# Patient Record
Sex: MALE | Race: WHITE | NOT HISPANIC OR LATINO | Employment: FULL TIME | URBAN - METROPOLITAN AREA
[De-identification: names, ages, dates, MRNs, and addresses within clinical notes are randomized per-mention and may not be internally consistent; named-entity substitution may affect disease eponyms.]

---

## 2023-05-31 ENCOUNTER — HOSPITAL ENCOUNTER (OUTPATIENT)
Facility: HOSPITAL | Age: 63
Setting detail: OBSERVATION
Discharge: HOME | End: 2023-06-02
Attending: EMERGENCY MEDICINE | Admitting: EMERGENCY MEDICINE
Payer: COMMERCIAL

## 2023-05-31 DIAGNOSIS — R55 VASOVAGAL NEAR-SYNCOPE: ICD-10-CM

## 2023-05-31 DIAGNOSIS — E86.0 DEHYDRATION: ICD-10-CM

## 2023-05-31 DIAGNOSIS — N17.9 ACUTE KIDNEY INJURY: Primary | ICD-10-CM

## 2023-05-31 LAB
ALBUMIN SERPL-MCNC: 4.5 G/DL (ref 3.5–5.2)
ALBUMIN/GLOB SERPL: 1.6 G/DL
ALP SERPL-CCNC: 118 U/L (ref 39–117)
ALT SERPL W P-5'-P-CCNC: 21 U/L (ref 1–41)
ANION GAP SERPL CALCULATED.3IONS-SCNC: 14 MMOL/L (ref 5–15)
AST SERPL-CCNC: 21 U/L (ref 1–40)
BASOPHILS # BLD AUTO: 0.08 10*3/MM3 (ref 0–0.2)
BASOPHILS NFR BLD AUTO: 0.4 % (ref 0–1.5)
BILIRUB SERPL-MCNC: 0.5 MG/DL (ref 0–1.2)
BUN SERPL-MCNC: 30 MG/DL (ref 8–23)
BUN/CREAT SERPL: 10.9 (ref 7–25)
CALCIUM SPEC-SCNC: 9.4 MG/DL (ref 8.6–10.5)
CHLORIDE SERPL-SCNC: 101 MMOL/L (ref 98–107)
CK SERPL-CCNC: 207 U/L (ref 20–200)
CO2 SERPL-SCNC: 22 MMOL/L (ref 22–29)
CREAT SERPL-MCNC: 2.75 MG/DL (ref 0.76–1.27)
DEPRECATED RDW RBC AUTO: 39 FL (ref 37–54)
EGFRCR SERPLBLD CKD-EPI 2021: 25.1 ML/MIN/1.73
EOSINOPHIL # BLD AUTO: 0.11 10*3/MM3 (ref 0–0.4)
EOSINOPHIL NFR BLD AUTO: 0.6 % (ref 0.3–6.2)
ERYTHROCYTE [DISTWIDTH] IN BLOOD BY AUTOMATED COUNT: 12.9 % (ref 12.3–15.4)
GLOBULIN UR ELPH-MCNC: 2.8 GM/DL
GLUCOSE SERPL-MCNC: 109 MG/DL (ref 65–99)
HCT VFR BLD AUTO: 45.8 % (ref 37.5–51)
HGB BLD-MCNC: 16.4 G/DL (ref 13–17.7)
IMM GRANULOCYTES # BLD AUTO: 0.18 10*3/MM3 (ref 0–0.05)
IMM GRANULOCYTES NFR BLD AUTO: 0.9 % (ref 0–0.5)
LYMPHOCYTES # BLD AUTO: 3.26 10*3/MM3 (ref 0.7–3.1)
LYMPHOCYTES NFR BLD AUTO: 16.5 % (ref 19.6–45.3)
MCH RBC QN AUTO: 30.1 PG (ref 26.6–33)
MCHC RBC AUTO-ENTMCNC: 35.8 G/DL (ref 31.5–35.7)
MCV RBC AUTO: 84.2 FL (ref 79–97)
MONOCYTES # BLD AUTO: 1.56 10*3/MM3 (ref 0.1–0.9)
MONOCYTES NFR BLD AUTO: 7.9 % (ref 5–12)
NEUTROPHILS NFR BLD AUTO: 14.62 10*3/MM3 (ref 1.7–7)
NEUTROPHILS NFR BLD AUTO: 73.7 % (ref 42.7–76)
NRBC BLD AUTO-RTO: 0.1 /100 WBC (ref 0–0.2)
PLATELET # BLD AUTO: 353 10*3/MM3 (ref 140–450)
PMV BLD AUTO: 10.5 FL (ref 6–12)
POTASSIUM SERPL-SCNC: 4.4 MMOL/L (ref 3.5–5.2)
PROT SERPL-MCNC: 7.3 G/DL (ref 6–8.5)
QT INTERVAL: 440 MS
RBC # BLD AUTO: 5.44 10*6/MM3 (ref 4.14–5.8)
SODIUM SERPL-SCNC: 137 MMOL/L (ref 136–145)
TROPONIN T SERPL HS-MCNC: 17 NG/L
WBC NRBC COR # BLD: 19.81 10*3/MM3 (ref 3.4–10.8)

## 2023-05-31 PROCEDURE — G0378 HOSPITAL OBSERVATION PER HR: HCPCS

## 2023-05-31 PROCEDURE — 80053 COMPREHEN METABOLIC PANEL: CPT | Performed by: EMERGENCY MEDICINE

## 2023-05-31 PROCEDURE — 85025 COMPLETE CBC W/AUTO DIFF WBC: CPT | Performed by: EMERGENCY MEDICINE

## 2023-05-31 PROCEDURE — 36415 COLL VENOUS BLD VENIPUNCTURE: CPT

## 2023-05-31 PROCEDURE — 93010 ELECTROCARDIOGRAM REPORT: CPT | Performed by: INTERNAL MEDICINE

## 2023-05-31 PROCEDURE — 84484 ASSAY OF TROPONIN QUANT: CPT | Performed by: EMERGENCY MEDICINE

## 2023-05-31 PROCEDURE — 99285 EMERGENCY DEPT VISIT HI MDM: CPT

## 2023-05-31 PROCEDURE — 93005 ELECTROCARDIOGRAM TRACING: CPT | Performed by: EMERGENCY MEDICINE

## 2023-05-31 PROCEDURE — 82550 ASSAY OF CK (CPK): CPT | Performed by: EMERGENCY MEDICINE

## 2023-05-31 RX ORDER — TIZANIDINE 4 MG/1
4 TABLET ORAL NIGHTLY
COMMUNITY

## 2023-05-31 RX ORDER — LISINOPRIL 20 MG/1
30 TABLET ORAL NIGHTLY
COMMUNITY

## 2023-05-31 RX ORDER — PANTOPRAZOLE SODIUM 40 MG/1
40 TABLET, DELAYED RELEASE ORAL NIGHTLY
Status: DISCONTINUED | OUTPATIENT
Start: 2023-05-31 | End: 2023-05-31

## 2023-05-31 RX ORDER — SODIUM CHLORIDE 0.9 % (FLUSH) 0.9 %
10 SYRINGE (ML) INJECTION EVERY 12 HOURS SCHEDULED
Status: DISCONTINUED | OUTPATIENT
Start: 2023-05-31 | End: 2023-06-02 | Stop reason: HOSPADM

## 2023-05-31 RX ORDER — SODIUM CHLORIDE 9 MG/ML
150 INJECTION, SOLUTION INTRAVENOUS CONTINUOUS
Status: DISCONTINUED | OUTPATIENT
Start: 2023-05-31 | End: 2023-06-02 | Stop reason: HOSPADM

## 2023-05-31 RX ORDER — CITALOPRAM 40 MG/1
40 TABLET ORAL NIGHTLY
Status: DISCONTINUED | OUTPATIENT
Start: 2023-05-31 | End: 2023-05-31

## 2023-05-31 RX ORDER — BUPROPION HYDROCHLORIDE 100 MG/1
100 TABLET ORAL NIGHTLY
COMMUNITY

## 2023-05-31 RX ORDER — AMLODIPINE BESYLATE 10 MG/1
10 TABLET ORAL NIGHTLY
COMMUNITY
Start: 2023-04-26

## 2023-05-31 RX ORDER — MELOXICAM 15 MG/1
15 TABLET ORAL NIGHTLY
COMMUNITY
Start: 2023-05-05

## 2023-05-31 RX ORDER — ONDANSETRON 2 MG/ML
4 INJECTION INTRAMUSCULAR; INTRAVENOUS EVERY 6 HOURS PRN
Status: DISCONTINUED | OUTPATIENT
Start: 2023-05-31 | End: 2023-06-02 | Stop reason: HOSPADM

## 2023-05-31 RX ORDER — ONDANSETRON 4 MG/1
4 TABLET, FILM COATED ORAL EVERY 6 HOURS PRN
Status: DISCONTINUED | OUTPATIENT
Start: 2023-05-31 | End: 2023-06-02 | Stop reason: HOSPADM

## 2023-05-31 RX ORDER — SODIUM CHLORIDE 9 MG/ML
40 INJECTION, SOLUTION INTRAVENOUS AS NEEDED
Status: DISCONTINUED | OUTPATIENT
Start: 2023-05-31 | End: 2023-06-02 | Stop reason: HOSPADM

## 2023-05-31 RX ORDER — ATORVASTATIN CALCIUM 40 MG/1
40 TABLET, FILM COATED ORAL NIGHTLY
COMMUNITY

## 2023-05-31 RX ORDER — SODIUM CHLORIDE 0.9 % (FLUSH) 0.9 %
10 SYRINGE (ML) INJECTION AS NEEDED
Status: DISCONTINUED | OUTPATIENT
Start: 2023-05-31 | End: 2023-06-02 | Stop reason: HOSPADM

## 2023-05-31 RX ORDER — ATORVASTATIN CALCIUM 20 MG/1
40 TABLET, FILM COATED ORAL NIGHTLY
Status: DISCONTINUED | OUTPATIENT
Start: 2023-05-31 | End: 2023-05-31

## 2023-05-31 RX ORDER — BUPROPION HYDROCHLORIDE 100 MG/1
100 TABLET ORAL NIGHTLY
Status: DISCONTINUED | OUTPATIENT
Start: 2023-05-31 | End: 2023-05-31

## 2023-05-31 RX ORDER — AMLODIPINE BESYLATE 10 MG/1
10 TABLET ORAL NIGHTLY
Status: DISCONTINUED | OUTPATIENT
Start: 2023-05-31 | End: 2023-05-31

## 2023-05-31 RX ORDER — TIZANIDINE 4 MG/1
4 TABLET ORAL NIGHTLY
Status: DISCONTINUED | OUTPATIENT
Start: 2023-05-31 | End: 2023-05-31

## 2023-05-31 RX ORDER — CITALOPRAM 40 MG/1
40 TABLET ORAL NIGHTLY
COMMUNITY
Start: 2023-04-02

## 2023-05-31 RX ORDER — PANTOPRAZOLE SODIUM 40 MG/1
40 TABLET, DELAYED RELEASE ORAL NIGHTLY
COMMUNITY
Start: 2023-04-03

## 2023-05-31 RX ORDER — NITROGLYCERIN 0.4 MG/1
0.4 TABLET SUBLINGUAL
Status: DISCONTINUED | OUTPATIENT
Start: 2023-05-31 | End: 2023-06-02 | Stop reason: HOSPADM

## 2023-05-31 RX ORDER — MELOXICAM 15 MG/1
15 TABLET ORAL NIGHTLY
Status: DISCONTINUED | OUTPATIENT
Start: 2023-05-31 | End: 2023-05-31

## 2023-05-31 RX ADMIN — Medication 10 ML: at 21:30

## 2023-05-31 RX ADMIN — SODIUM CHLORIDE, POTASSIUM CHLORIDE, SODIUM LACTATE AND CALCIUM CHLORIDE 1000 ML: 600; 310; 30; 20 INJECTION, SOLUTION INTRAVENOUS at 23:31

## 2023-05-31 RX ADMIN — SODIUM CHLORIDE 150 ML/HR: 9 INJECTION, SOLUTION INTRAVENOUS at 23:24

## 2023-05-31 NOTE — ED PROVIDER NOTES
EMERGENCY DEPARTMENT ENCOUNTER    Room Number:  106/1  Date seen:  5/31/2023  PCP: Robyn Mendieta APRN  Historian: Patient, EMS      HPI:  Chief Complaint: Near syncope  A complete HPI/ROS/PMH/PSH/SH/FH are unobtainable due to: Nothing  Context: Armani Medrano is a 62 y.o. male who presents to the ED from work by EMS c/o near syncope.  Patient was sitting down at work.  He then stood up and began walking.  He became lightheaded and faint feeling.  He felt clammy, sweaty, nauseated.  He thought he was going to pass out.  He then sat down and his symptoms improved.  When he stood up again several minutes later, his symptoms returned.  He never lost consciousness.  Denies preceding headache, chest pain, palpitations, or abdominal pain.  He had a similar episode yesterday.  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  He had an episode of vomiting in route to the ED.  Per EMS, his blood pressure was initially 50/30.  He was given a total of 700 cc of normal saline in route.  Blood pressure was 120/91 at triage.  Denies recent illness, cough, fever, abdominal pain, or diarrhea.  Patient is feeling better now.          PAST MEDICAL HISTORY  Active Ambulatory Problems     Diagnosis Date Noted   • No Active Ambulatory Problems     Resolved Ambulatory Problems     Diagnosis Date Noted   • No Resolved Ambulatory Problems     No Additional Past Medical History         PAST SURGICAL HISTORY  History reviewed. No pertinent surgical history.      FAMILY HISTORY  History reviewed. No pertinent family history.      SOCIAL HISTORY  Social History     Socioeconomic History   • Marital status: Single   Tobacco Use   • Smoking status: Every Day     Packs/day: 1.00     Years: 50.00     Pack years: 50.00     Types: Cigarettes   Vaping Use   • Vaping Use: Never used   Substance and Sexual Activity   • Alcohol use: Not Currently   • Drug use: Never         ALLERGIES  Patient has no known  allergies.        REVIEW OF SYSTEMS  Review of Systems     All systems have been reviewed and are negative except as as discussed in the HPI    PHYSICAL EXAM  ED Triage Vitals   Temp Heart Rate Resp BP SpO2   05/31/23 1641 05/31/23 1641 05/31/23 1641 05/31/23 1641 05/31/23 1641   98 °F (36.7 °C) 79 17 120/91 96 %      Temp src Heart Rate Source Patient Position BP Location FiO2 (%)   -- 05/31/23 1700 05/31/23 1701 05/31/23 1701 --    Monitor Lying Right arm        Physical Exam      GENERAL: Awake, alert, oriented x3.  Well-developed, well-nourished male.  Resting comfortably in no acute distress  HENT: NCAT, nares patent, moist mucous membranes  EYES: Normal, EOMI  CV: regular rhythm, normal rate  RESPIRATORY: normal effort, clear to auscultation bilaterally  ABDOMEN: soft, nontender  MUSCULOSKELETAL: Extremities are nontender with full range of motion.  No calf tenderness  NEURO: Speech is normal.  No facial droop.  Normal strength and light touch sensation in all extremities.  PSYCH:  calm, cooperative  SKIN: warm, dry    Vital signs and nursing notes reviewed.          LAB RESULTS  Recent Results (from the past 24 hour(s))   ECG 12 Lead Syncope    Collection Time: 05/31/23  5:43 PM   Result Value Ref Range    QT Interval 440 ms   Comprehensive Metabolic Panel    Collection Time: 05/31/23  6:11 PM    Specimen: Blood   Result Value Ref Range    Glucose 109 (H) 65 - 99 mg/dL    BUN 30 (H) 8 - 23 mg/dL    Creatinine 2.75 (H) 0.76 - 1.27 mg/dL    Sodium 137 136 - 145 mmol/L    Potassium 4.4 3.5 - 5.2 mmol/L    Chloride 101 98 - 107 mmol/L    CO2 22.0 22.0 - 29.0 mmol/L    Calcium 9.4 8.6 - 10.5 mg/dL    Total Protein 7.3 6.0 - 8.5 g/dL    Albumin 4.5 3.5 - 5.2 g/dL    ALT (SGPT) 21 1 - 41 U/L    AST (SGOT) 21 1 - 40 U/L    Alkaline Phosphatase 118 (H) 39 - 117 U/L    Total Bilirubin 0.5 0.0 - 1.2 mg/dL    Globulin 2.8 gm/dL    A/G Ratio 1.6 g/dL    BUN/Creatinine Ratio 10.9 7.0 - 25.0    Anion Gap 14.0 5.0 - 15.0  mmol/L    eGFR 25.1 (L) >60.0 mL/min/1.73   Single High Sensitivity Troponin T    Collection Time: 05/31/23  6:11 PM    Specimen: Blood   Result Value Ref Range    HS Troponin T 17 (H) <15 ng/L   CBC Auto Differential    Collection Time: 05/31/23  6:11 PM    Specimen: Blood   Result Value Ref Range    WBC 19.81 (H) 3.40 - 10.80 10*3/mm3    RBC 5.44 4.14 - 5.80 10*6/mm3    Hemoglobin 16.4 13.0 - 17.7 g/dL    Hematocrit 45.8 37.5 - 51.0 %    MCV 84.2 79.0 - 97.0 fL    MCH 30.1 26.6 - 33.0 pg    MCHC 35.8 (H) 31.5 - 35.7 g/dL    RDW 12.9 12.3 - 15.4 %    RDW-SD 39.0 37.0 - 54.0 fl    MPV 10.5 6.0 - 12.0 fL    Platelets 353 140 - 450 10*3/mm3    Neutrophil % 73.7 42.7 - 76.0 %    Lymphocyte % 16.5 (L) 19.6 - 45.3 %    Monocyte % 7.9 5.0 - 12.0 %    Eosinophil % 0.6 0.3 - 6.2 %    Basophil % 0.4 0.0 - 1.5 %    Immature Grans % 0.9 (H) 0.0 - 0.5 %    Neutrophils, Absolute 14.62 (H) 1.70 - 7.00 10*3/mm3    Lymphocytes, Absolute 3.26 (H) 0.70 - 3.10 10*3/mm3    Monocytes, Absolute 1.56 (H) 0.10 - 0.90 10*3/mm3    Eosinophils, Absolute 0.11 0.00 - 0.40 10*3/mm3    Basophils, Absolute 0.08 0.00 - 0.20 10*3/mm3    Immature Grans, Absolute 0.18 (H) 0.00 - 0.05 10*3/mm3    nRBC 0.1 0.0 - 0.2 /100 WBC   CK    Collection Time: 05/31/23  6:11 PM    Specimen: Blood   Result Value Ref Range    Creatine Kinase 207 (H) 20 - 200 U/L       Ordered the above labs and reviewed the results.        RADIOLOGY  No Radiology Exams Resulted Within Past 24 Hours    Ordered the above noted radiological studies. Reviewed by me in PACS.            PROCEDURES  Procedures              MEDICATIONS GIVEN IN ER  Medications   lactated ringers bolus 1,000 mL (has no administration in time range)   sodium chloride 0.9 % flush 10 mL (10 mL Intravenous Given 5/31/23 2130)   sodium chloride 0.9 % flush 10 mL (has no administration in time range)   sodium chloride 0.9 % infusion 40 mL (has no administration in time range)   ondansetron (ZOFRAN) tablet 4 mg  (has no administration in time range)     Or   ondansetron (ZOFRAN) injection 4 mg (has no administration in time range)   nitroglycerin (NITROSTAT) SL tablet 0.4 mg (has no administration in time range)   sodium chloride 0.9 % infusion (has no administration in time range)   amLODIPine (NORVASC) tablet 10 mg (has no administration in time range)   buPROPion (WELLBUTRIN) tablet 100 mg (has no administration in time range)   citalopram (CeleXA) tablet 40 mg (has no administration in time range)   lisinopril (PRINIVIL,ZESTRIL) tablet 30 mg (has no administration in time range)   pantoprazole (PROTONIX) EC tablet 40 mg (has no administration in time range)   tiZANidine (ZANAFLEX) tablet 4 mg (has no administration in time range)                   MEDICAL DECISION MAKING, PROGRESS, and CONSULTS    All labs have been independently reviewed by me.  All radiology studies have been reviewed by me and I have also reviewed the radiology report.   EKG's independently viewed and interpreted by me.  Discussion below represents my analysis of pertinent findings related to patient's condition, differential diagnosis, treatment plan and final disposition.      Additional sources:  - Discussed/ obtained information from independent historians: EMS    - External (non-ED) record review: Patient does not have any previous records in the EMR.    - Chronic or social conditions impacting care: N/A          Orders placed during this visit:  Orders Placed This Encounter   Procedures   • Comprehensive Metabolic Panel   • Single High Sensitivity Troponin T   • CBC Auto Differential   • CK   • CBC (No Diff)   • Basic Metabolic Panel   • Diet: Regular/House Diet; Texture: Regular Texture (IDDSI 7); Fluid Consistency: Thin (IDDSI 0)   • Advance Diet As Tolerated -   • Intake & Output   • Weigh Patient   • Oral Care   • Place Sequential Compression Device   • Maintain Sequential Compression Device   • Telemetry - Maintain IV Access   • Telemetry  - Place Orders & Notify Provider of Results When Patient Experiences Acute Chest Pain, Dysrhythmia or Respiratory Distress   • May Be Off Telemetry for Tests   • Vital Signs   • Pulse Oximetry, Continuous   • Activity - Ad Kym   • Code Status and Medical Interventions:   • ECG 12 Lead Syncope   • Insert Peripheral IV   • Initiate ED Observation Status   • CBC & Differential         Additional orders considered but not ordered:  N/A        Differential diagnosis:    Orthostasis, vasovagal episode, dehydration, electrolyte abnormality, arrhythmia      Independent interpretation of labs, radiology studies, and discussions with consultants:  ED Course as of 05/31/23 2307   Wed May 31, 2023   1752 EKG personally interpreted by me.  My personal interpretation is:          EKG time: 1743  Rhythm/Rate: Sinus rhythm, rate 66  P waves and LA: Normal  QRS, axis: LAD, inferior Q waves  ST and T waves: Normal    Interpreted Contemporaneously by me at 1748, independently viewed  No prior available for comparison    []   1947 BUN(!): 30 []   1947 Creatinine(!): 2.75 []   1947 WBC(!): 19.81 []   1947 HS Troponin T(!): 17 [WH]   1954 Test results discussed with the patient.  He is resting comfortably.  Blood pressure is currently 113/66.  Heart rate is in the 60s.  Patient's BUN and creatinine are elevated.  He denies any history of kidney disease.  He saw his PCP about 3 months ago and had labs done at that time and was not told there were any abnormalities then.  Suspect that his near-syncopal episode was due to hypotension from dehydration.  Patient will be given additional IV fluids and will be admitted.  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  Temperature has been around 80 the past 2 days.  He states that he has been drinking soft drinks at work and has not been drinking much water. [WH]   2000 Case discussed with PAULINO Yu, and she agrees to admit the patient to Dr. Ramírez.  Pertinent  history, exam findings, test results, ED course, and diagnoses were discussed with her. []   2100 Creatine Kinase(!): 207 []      ED Course User Index  [] Armani Che MD               DIAGNOSIS  Final diagnoses:   Acute kidney injury   Vasovagal near-syncope   Dehydration         DISPOSITION  ADMISSION    Discussed treatment plan and reason for admission with pt/family and admitting physician.  Pt/family voiced understanding of the plan for admission for further testing/treatment as needed.                 Latest Documented Vital Signs:  As of 23:07 EDT  BP- 154/66 HR- 66 Temp- 97.8 °F (36.6 °C) (Oral) O2 sat- 96%              --    Please note that portions of this were completed with a voice recognition program.       Note Disclaimer: At [facility], we believe that sharing information builds trust and better relationships. You are receiving this note because you are receiving care at [facility] or recently visited. It is possible you will see health information before a provider has talked with you about it. This kind of information can be easy to misunderstand. To help you fully understand what it means for your health, we urge you to discuss this note with your provider.           Armani Che MD  05/31/23 3423

## 2023-05-31 NOTE — ED NOTES
Pt to er via ems from home. Pt had near syncopal episode. Upon EMS arrival pt BP 50/30. Pt has received 700 NS.

## 2023-06-01 ENCOUNTER — APPOINTMENT (OUTPATIENT)
Dept: CARDIOLOGY | Facility: HOSPITAL | Age: 63
End: 2023-06-01
Payer: COMMERCIAL

## 2023-06-01 ENCOUNTER — APPOINTMENT (OUTPATIENT)
Dept: ULTRASOUND IMAGING | Facility: HOSPITAL | Age: 63
End: 2023-06-01
Payer: COMMERCIAL

## 2023-06-01 LAB
ANION GAP SERPL CALCULATED.3IONS-SCNC: 13.5 MMOL/L (ref 5–15)
AORTIC ARCH: 3.7 CM
AORTIC DIMENSIONLESS INDEX: 1 (DI)
ASCENDING AORTA: 3.2 CM
BACTERIA UR QL AUTO: ABNORMAL /HPF
BH CV ECHO MEAS - AO MAX PG: 7 MMHG
BH CV ECHO MEAS - AO MEAN PG: 4 MMHG
BH CV ECHO MEAS - AO V2 MAX: 131.9 CM/SEC
BH CV ECHO MEAS - AO V2 VTI: 27.7 CM
BH CV ECHO MEAS - AVA(I,D): 3.8 CM2
BH CV ECHO MEAS - EDV(CUBED): 112.4 ML
BH CV ECHO MEAS - EDV(MOD-SP2): 65 ML
BH CV ECHO MEAS - EDV(MOD-SP4): 112 ML
BH CV ECHO MEAS - EF(MOD-BP): 67 %
BH CV ECHO MEAS - EF(MOD-SP2): 66.2 %
BH CV ECHO MEAS - EF(MOD-SP4): 66.1 %
BH CV ECHO MEAS - ESV(CUBED): 34.3 ML
BH CV ECHO MEAS - ESV(MOD-SP2): 22 ML
BH CV ECHO MEAS - ESV(MOD-SP4): 38 ML
BH CV ECHO MEAS - FS: 32.7 %
BH CV ECHO MEAS - IVS/LVPW: 1.19 CM
BH CV ECHO MEAS - IVSD: 1.17 CM
BH CV ECHO MEAS - LAT PEAK E' VEL: 13.7 CM/SEC
BH CV ECHO MEAS - LV DIASTOLIC VOL/BSA (35-75): 50.5 CM2
BH CV ECHO MEAS - LV MASS(C)D: 190.4 GRAMS
BH CV ECHO MEAS - LV MAX PG: 7.4 MMHG
BH CV ECHO MEAS - LV MEAN PG: 3.6 MMHG
BH CV ECHO MEAS - LV SYSTOLIC VOL/BSA (12-30): 17.1 CM2
BH CV ECHO MEAS - LV V1 MAX: 135.7 CM/SEC
BH CV ECHO MEAS - LV V1 VTI: 27.8 CM
BH CV ECHO MEAS - LVIDD: 4.8 CM
BH CV ECHO MEAS - LVIDS: 3.2 CM
BH CV ECHO MEAS - LVOT AREA: 3.8 CM2
BH CV ECHO MEAS - LVOT DIAM: 2.2 CM
BH CV ECHO MEAS - LVPWD: 0.99 CM
BH CV ECHO MEAS - MED PEAK E' VEL: 8.8 CM/SEC
BH CV ECHO MEAS - MV A DUR: 0.12 SEC
BH CV ECHO MEAS - MV A MAX VEL: 68.1 CM/SEC
BH CV ECHO MEAS - MV DEC SLOPE: 288.8 CM/SEC2
BH CV ECHO MEAS - MV DEC TIME: 225 MSEC
BH CV ECHO MEAS - MV E MAX VEL: 62.7 CM/SEC
BH CV ECHO MEAS - MV E/A: 0.92
BH CV ECHO MEAS - MV MEAN PG: 1.1 MMHG
BH CV ECHO MEAS - MV P1/2T: 86.5 MSEC
BH CV ECHO MEAS - MV V2 VTI: 24.5 CM
BH CV ECHO MEAS - MVA(P1/2T): 2.5 CM2
BH CV ECHO MEAS - MVA(VTI): 4.3 CM2
BH CV ECHO MEAS - PA ACC SLOPE: 1358 CM/SEC2
BH CV ECHO MEAS - PA ACC TIME: 0.08 SEC
BH CV ECHO MEAS - PA PR(ACCEL): 42.6 MMHG
BH CV ECHO MEAS - PA V2 MAX: 104.5 CM/SEC
BH CV ECHO MEAS - PULM A REVS DUR: 0.11 SEC
BH CV ECHO MEAS - PULM A REVS VEL: 24.3 CM/SEC
BH CV ECHO MEAS - PULM DIAS VEL: 51.7 CM/SEC
BH CV ECHO MEAS - PULM S/D: 1
BH CV ECHO MEAS - PULM SYS VEL: 51.7 CM/SEC
BH CV ECHO MEAS - RV MAX PG: 2.25 MMHG
BH CV ECHO MEAS - RV V1 MAX: 75 CM/SEC
BH CV ECHO MEAS - RV V1 VTI: 17.4 CM
BH CV ECHO MEAS - SI(MOD-SP2): 19.4 ML/M2
BH CV ECHO MEAS - SI(MOD-SP4): 33.4 ML/M2
BH CV ECHO MEAS - SV(LVOT): 106 ML
BH CV ECHO MEAS - SV(MOD-SP2): 43 ML
BH CV ECHO MEAS - SV(MOD-SP4): 74 ML
BH CV ECHO MEASUREMENTS AVERAGE E/E' RATIO: 5.57
BH CV XLRA - TDI S': 13.8 CM/SEC
BILIRUB UR QL STRIP: NEGATIVE
BUN SERPL-MCNC: 35 MG/DL (ref 8–23)
BUN/CREAT SERPL: 16.7 (ref 7–25)
CALCIUM SPEC-SCNC: 9.2 MG/DL (ref 8.6–10.5)
CHLORIDE SERPL-SCNC: 102 MMOL/L (ref 98–107)
CHLORIDE UR-SCNC: 44 MMOL/L
CLARITY UR: CLEAR
CO2 SERPL-SCNC: 22.5 MMOL/L (ref 22–29)
COLOR UR: YELLOW
CREAT SERPL-MCNC: 2.09 MG/DL (ref 0.76–1.27)
CREAT UR-MCNC: 94.8 MG/DL
DEPRECATED RDW RBC AUTO: 39.9 FL (ref 37–54)
EGFRCR SERPLBLD CKD-EPI 2021: 34.9 ML/MIN/1.73
ERYTHROCYTE [DISTWIDTH] IN BLOOD BY AUTOMATED COUNT: 12.8 % (ref 12.3–15.4)
GLUCOSE SERPL-MCNC: 94 MG/DL (ref 65–99)
GLUCOSE UR STRIP-MCNC: NEGATIVE MG/DL
HCT VFR BLD AUTO: 44.1 % (ref 37.5–51)
HGB BLD-MCNC: 15.1 G/DL (ref 13–17.7)
HGB UR QL STRIP.AUTO: ABNORMAL
HYALINE CASTS UR QL AUTO: ABNORMAL /LPF
KETONES UR QL STRIP: NEGATIVE
LEFT ATRIUM VOLUME INDEX: 25.2 ML/M2
LEUKOCYTE ESTERASE UR QL STRIP.AUTO: ABNORMAL
MAXIMAL PREDICTED HEART RATE: 157 BPM
MCH RBC QN AUTO: 29.1 PG (ref 26.6–33)
MCHC RBC AUTO-ENTMCNC: 34.2 G/DL (ref 31.5–35.7)
MCV RBC AUTO: 85 FL (ref 79–97)
NITRITE UR QL STRIP: NEGATIVE
PH UR STRIP.AUTO: 5.5 [PH] (ref 5–8)
PLATELET # BLD AUTO: 322 10*3/MM3 (ref 140–450)
PMV BLD AUTO: 10.4 FL (ref 6–12)
POTASSIUM SERPL-SCNC: 3.9 MMOL/L (ref 3.5–5.2)
PROT ?TM UR-MCNC: 10.1 MG/DL
PROT UR QL STRIP: NEGATIVE
PROT/CREAT UR: 106.5 MG/G CREA (ref 0–200)
RBC # BLD AUTO: 5.19 10*6/MM3 (ref 4.14–5.8)
RBC # UR STRIP: ABNORMAL /HPF
REF LAB TEST METHOD: ABNORMAL
SODIUM SERPL-SCNC: 138 MMOL/L (ref 136–145)
SODIUM UR-SCNC: 68 MMOL/L
SP GR UR STRIP: 1.01 (ref 1–1.03)
SQUAMOUS #/AREA URNS HPF: ABNORMAL /HPF
STRESS TARGET HR: 133 BPM
UROBILINOGEN UR QL STRIP: ABNORMAL
WBC # UR STRIP: ABNORMAL /HPF
WBC NRBC COR # BLD: 13.26 10*3/MM3 (ref 3.4–10.8)

## 2023-06-01 PROCEDURE — 96361 HYDRATE IV INFUSION ADD-ON: CPT

## 2023-06-01 PROCEDURE — 99204 OFFICE O/P NEW MOD 45 MIN: CPT | Performed by: INTERNAL MEDICINE

## 2023-06-01 PROCEDURE — 85027 COMPLETE CBC AUTOMATED: CPT

## 2023-06-01 PROCEDURE — 80048 BASIC METABOLIC PNL TOTAL CA: CPT

## 2023-06-01 PROCEDURE — G0378 HOSPITAL OBSERVATION PER HR: HCPCS

## 2023-06-01 PROCEDURE — 81001 URINALYSIS AUTO W/SCOPE: CPT | Performed by: INTERNAL MEDICINE

## 2023-06-01 PROCEDURE — 82570 ASSAY OF URINE CREATININE: CPT | Performed by: INTERNAL MEDICINE

## 2023-06-01 PROCEDURE — 84156 ASSAY OF PROTEIN URINE: CPT | Performed by: INTERNAL MEDICINE

## 2023-06-01 PROCEDURE — 93306 TTE W/DOPPLER COMPLETE: CPT | Performed by: INTERNAL MEDICINE

## 2023-06-01 PROCEDURE — 84300 ASSAY OF URINE SODIUM: CPT | Performed by: INTERNAL MEDICINE

## 2023-06-01 PROCEDURE — 25510000001 PERFLUTREN (DEFINITY) 8.476 MG IN SODIUM CHLORIDE (PF) 0.9 % 10 ML INJECTION: Performed by: NURSE PRACTITIONER

## 2023-06-01 PROCEDURE — 76775 US EXAM ABDO BACK WALL LIM: CPT

## 2023-06-01 PROCEDURE — 96360 HYDRATION IV INFUSION INIT: CPT

## 2023-06-01 PROCEDURE — 93306 TTE W/DOPPLER COMPLETE: CPT

## 2023-06-01 PROCEDURE — 82436 ASSAY OF URINE CHLORIDE: CPT | Performed by: INTERNAL MEDICINE

## 2023-06-01 RX ADMIN — Medication 10 ML: at 09:00

## 2023-06-01 RX ADMIN — SODIUM CHLORIDE 150 ML/HR: 9 INJECTION, SOLUTION INTRAVENOUS at 19:50

## 2023-06-01 RX ADMIN — PERFLUTREN 2 ML: 6.52 INJECTION, SUSPENSION INTRAVENOUS at 10:56

## 2023-06-01 NOTE — PLAN OF CARE
Goal Outcome Evaluation:   Patient admitted for acute kidney injury.   Outcome summary: Patient stable throughout the night. AO x 4, up ad lois. Monitoring for dehydration and awaiting morning labs for kidney function. If improvement, can go home.

## 2023-06-01 NOTE — PROGRESS NOTES
ED OBSERVATION PROGRESS/DISCHARGE SUMMARY    Date of Admission: 5/31/2023   LOS: 0 days   PCP: Robyn Mendieta APRN    Final Diagnosis Acute Kidney injury, Syncope      Subjective     Hospital Outcome:   Pleasant afebrile ambulatory 62  male admitted to the ED Observation unit for near syncope. He was found to have an acute kidney injury.He reports he only mountain [place]w usually. He was seen and evaluated by Dr. Franks with the nephrology service and will undergo renal ultrasound today.     His orthostatic vital signs were negative this morning. After a liter bolus and increased PO intake his creatinine improved from 2.75 to 2.09 this morning.  He underwent echocardiogram which showed ejection fraction 60 to 65% without evidence pericardial effusion.  Seen and evaluated by Dr. Chacon with cardiology service who does not feel there is an acute cardiac event that his cause patient's episode.    ROS:  General: no fevers, chills  Respiratory: no cough, dyspnea  Cardiovascular: no chest pain, palpitations  Abdomen: No abdominal pain, nausea, vomiting, or diarrhea  Neurologic: No focal weakness    Objective   Physical Exam:  I have reviewed the vital signs.  Temp:  [97.4 °F (36.3 °C)-98 °F (36.7 °C)] 97.4 °F (36.3 °C)  Heart Rate:  [66-81] 77  Resp:  [16-18] 16  BP: ()/(49-91) 153/66  General Appearance:    Alert, cooperative, no distress  Head:    Normocephalic, atraumatic  Eyes:    Sclerae anicteric  Neck:   Supple, no mass  Lungs: Clear to auscultation bilaterally, respirations unlabored  Heart: Regular rate and rhythm, S1 and S2 normal, no murmur, rub or gallop  Abdomen:  Soft, non-tender, bowel sounds active, nondistended  Extremities: No clubbing, cyanosis, or edema to lower extremities  Pulses:  2+ and symmetric in distal lower extremities  Skin: No rashes   Neurologic: Oriented x3, Normal strength to extremities    Results Review:    I have reviewed the labs, radiology results and  diagnostic studies.    Results from last 7 days   Lab Units 06/01/23  0617   WBC 10*3/mm3 13.26*   HEMOGLOBIN g/dL 15.1   HEMATOCRIT % 44.1   PLATELETS 10*3/mm3 322     Results from last 7 days   Lab Units 06/01/23  0617 05/31/23  1811   SODIUM mmol/L 138 137   POTASSIUM mmol/L 3.9 4.4   CHLORIDE mmol/L 102 101   CO2 mmol/L 22.5 22.0   BUN mg/dL 35* 30*   CREATININE mg/dL 2.09* 2.75*   CALCIUM mg/dL 9.2 9.4   BILIRUBIN mg/dL  --  0.5   ALK PHOS U/L  --  118*   ALT (SGPT) U/L  --  21   AST (SGOT) U/L  --  21   GLUCOSE mg/dL 94 109*     Imaging Results (Last 24 Hours)     ** No results found for the last 24 hours. **          I have reviewed the medications.  ---------------------------------------------------------------------------------------------  Assessment & Plan   Assessment/Problem List    Acute kidney injury      Plan:    Acute kidney injury  - Cardiac monitoring  - Continuous pulse ox  - Vital signs every 4 hours  - Normal saline at 150 mL/h  - Repeat labs in a.m.  - Avoid nephrotoxic drugs  -Nephrology consult following along  -Cardiology Consult, cleared  -orthostatic vitals signs negative this AM  -renal ultrasound normal    Disposition: I anticipate patient will be discharged home tomorrow.     This note will serve as a progress note.    Gabriella Ingram, APRN 06/01/23 08:56 EDT    I have worn appropriate PPE during this patient encounter, sanitized my hands both with entering and exiting patient's room.      54 minutes has been spent by [facility] providers in the care of this patient while under observation status

## 2023-06-01 NOTE — CONSULTS
[facility] Consult Note      Patient Name: Armani Medrano  : 1960  MRN: [id number]  Primary Care Physician:  Robyn Mendieta APRN  Referring Physician: No ref. provider found  Date of admission: 2023    Subjective     Reason for Consult:  JERICA    HPI:   Armani Medrano is a 62 y.o. male with a past medical history of hypertension and NSAID use who came into the hospital because of syncopal-like episode and dizziness.  Patient does take NSAIDs as an outpatient.  He has been working very hard environment.  At time of arrival of the EMS his blood pressure was 50/30 and creatinine was found to be 2.7 milligrams per deciliter.  We were consulted to help with management of his acute kidney injury.  Patient was started on IV fluid and his creatinine down to 2 mg/dL    Review of Systems:   14 point review of systems is otherwise negative except for mentioned above on HPI    Personal History     History reviewed. No pertinent past medical history.    History reviewed. No pertinent surgical history.    Family History: family history is not on file.    Social History:  reports that he has been smoking cigarettes. He has a 50.00 pack-year smoking history. He does not have any smokeless tobacco history on file. He reports that he does not currently use alcohol. He reports that he does not use drugs.    Home Medications:  Prior to Admission medications    Medication Sig Start Date End Date Taking? Authorizing Provider   amLODIPine (NORVASC) 10 MG tablet Take 1 tablet by mouth Every Night. 23  Yes Provider, MD Michelle   atorvastatin (LIPITOR) 40 MG tablet Take 1 tablet by mouth Every Night.   Yes Provider, MD Michelle   buPROPion (WELLBUTRIN) 100 MG tablet Take 1 tablet by mouth Every Night.   Yes Provider, MD Michelle   citalopram (CeleXA) 40 MG tablet Take 1 tablet by mouth Every Night. 23  Yes Provider, MD Michelle   lisinopril (PRINIVIL,ZESTRIL) 20 MG tablet  Take 1.5 tablets by mouth Every Night.   Yes ProviderMichelle MD   meloxicam (MOBIC) 15 MG tablet Take 1 tablet by mouth Every Night. 5/5/23  Yes Michelle Browne MD   pantoprazole (PROTONIX) 40 MG EC tablet Take 1 tablet by mouth Every Night. 4/3/23  Yes Michelle Browne MD   tiZANidine (ZANAFLEX) 4 MG tablet Take 1 tablet by mouth Every Night.   Yes Provider, MD Michelle       Allergies:  No Known Allergies    Objective     Vitals:   Temp:  [97.4 °F (36.3 °C)-98 °F (36.7 °C)] 97.4 °F (36.3 °C)  Heart Rate:  [66-81] 77  Resp:  [16-18] 16  BP: ()/(49-91) 153/66  Flow (L/min):  [2] 2  No intake or output data in the 24 hours ending 06/01/23 0903    Physical Exam:    General Appearance: alert, oriented x 3, no acute distress   Skin: warm and dry  HEENT: oral mucosa normal, nonicteric sclera  Neck: supple, no JVD  Lungs: CTA  Heart: RRR, normal S1 and S2  Abdomen: soft, nontender, nondistended  : no palpable bladder  Extremities: no edema, cyanosis or clubbing  Neuro: normal speech and mental status     Scheduled Meds:     sodium chloride, 10 mL, Intravenous, Q12H      IV Meds:   sodium chloride, 150 mL/hr, Last Rate: 150 mL/hr (06/01/23 0209)        Results Reviewed:   I have personally reviewed the results from the time of this admission to 6/1/2023 09:03 EDT     Lab Results   Component Value Date    GLUCOSE 94 06/01/2023    CALCIUM 9.2 06/01/2023     06/01/2023    K 3.9 06/01/2023    CO2 22.5 06/01/2023     06/01/2023    BUN 35 (H) 06/01/2023    CREATININE 2.09 (H) 06/01/2023    BCR 16.7 06/01/2023    ANIONGAP 13.5 06/01/2023      Lab Results   Component Value Date    ALBUMIN 4.5 05/31/2023           Assessment / Plan     ASSESSMENT:    1.  Acute kidney injury that appears to be due to intravascular volume depletion in the setting of impaired renal autoregulation due to NSAID use.  We will continue IV hydration.  Will obtain UA and urine electrolytes.  We will check renal  ultrasound as well.  2.  Intravascular volume depletion continue with IV hydration  3.  History of hypertension continue current management for now and adjust medication as needed    PLAN:    1.  Continue IV hydration  2.  Urine electrolytes and renal ultrasound  3.  Avoid nephrotoxic medications  4.  Surveillance labs    Thank you for involving us in the care of Armani Medrano.  Please feel free to call with any questions.    Chris Franks MD  06/01/23  09:03 EDT    [facility]  [phone]

## 2023-06-01 NOTE — CONSULTS
Date of Consultation: 23    Referral Provider: Reilly Ramírez MD     Reason for Consultation: Syncope    Encounter Provider: Charanjit Chacon MD    Group of Service: [facility]     Patient Name: Armani Medrano    :1960    Chief complaint: Near syncope.    History of Present Illness:  Armani Medrano is a 62 year old with a past medical history of HTN and KAL who presented to [facility] ED with complaints of a near syncopal episode. He works as a  and reports feeling lightheaded, clammy, and nauseated when standing. He sat down to rest and 5-10 minutes his symptoms improved. He stood up again and symptoms returned. This happened 5 times so he called EMS. Upon arrival to the ED his BP was 50/30. He was given IV fluids and his BP normalized. He reports that he drinks a five hour energy in the morning and then Mt. Dew or Coke, and he does not drink water.  He has responded very well to hydration and feels much better.  He has not had any chest pain, palpitations, or other symptoms.        History reviewed. No pertinent past medical history.      History reviewed. No pertinent surgical history.      No Known Allergies      No current facility-administered medications on file prior to encounter.     Current Outpatient Medications on File Prior to Encounter   Medication Sig Dispense Refill   • amLODIPine (NORVASC) 10 MG tablet Take 1 tablet by mouth Every Night.     • atorvastatin (LIPITOR) 40 MG tablet Take 1 tablet by mouth Every Night.     • buPROPion (WELLBUTRIN) 100 MG tablet Take 1 tablet by mouth Every Night.     • citalopram (CeleXA) 40 MG tablet Take 1 tablet by mouth Every Night.     • lisinopril (PRINIVIL,ZESTRIL) 20 MG tablet Take 1.5 tablets by mouth Every Night.     • meloxicam (MOBIC) 15 MG tablet Take 1 tablet by mouth Every Night.     • pantoprazole (PROTONIX) 40 MG EC tablet Take 1 tablet by mouth Every Night.     • tiZANidine (ZANAFLEX) 4 MG tablet Take 1  "tablet by mouth Every Night.           Social History     Socioeconomic History   • Marital status: Single   Tobacco Use   • Smoking status: Every Day     Packs/day: 1.00     Years: 50.00     Pack years: 50.00     Types: Cigarettes   Vaping Use   • Vaping Use: Never used   Substance and Sexual Activity   • Alcohol use: Not Currently   • Drug use: Never         History reviewed. No pertinent family history.    REVIEW OF SYSTEMS:   Pertinent positives are noted in the HPI above.  Otherwise, all other systems were reviewed, and are negative.     Objective:     Vitals:    06/01/23 0820 06/01/23 1107 06/01/23 1122 06/01/23 1558   BP: 153/66 134/61 146/71 144/66   BP Location: Right arm  Right arm Right arm   Patient Position: Standing  Lying Lying   Pulse: 77 67 74 59   Resp:   16 16   Temp:   98 °F (36.7 °C) 98.2 °F (36.8 °C)   TempSrc:   Oral Oral   SpO2:   95% 95%   Weight:  107 kg (236 lb)     Height:  175.3 cm (69\")       Body mass index is 34.85 kg/m².  Flowsheet Rows    Flowsheet Row First Filed Value   Admission Height 175.3 cm (69\") Documented at 05/31/2023 1641   Admission Weight 109 kg (240 lb) Documented at 05/31/2023 1641           General:    No acute distress, alert and oriented x4, pleasant                   Head:    Normocephalic, atraumatic.   Eyes:          Conjunctivae and sclerae normal, no icterus.   Throat:   No oral lesions, no thrush, oral mucosa moist.    Neck:   Supple, trachea midline.   Lungs:     Clear to auscultation bilaterally     Heart:    Regular rhythm and normal rate.  No murmurs, gallops, or rubs noted.   Abdomen:     Soft, non-tender, non-distended, positive bowel sounds.    Extremities:   No clubbing, cyanosis, or edema.     Pulses:   Pulses palpable and equal bilaterally.    Skin:   No bleeding or rash.   Neuro:   Non-focal.  Moves all extremities well.    Psychiatric:   Normal mood and affect.     Lab Review:                Results from last 7 days   Lab Units 06/01/23  0617 "   SODIUM mmol/L 138   POTASSIUM mmol/L 3.9   CHLORIDE mmol/L 102   CO2 mmol/L 22.5   BUN mg/dL 35*   CREATININE mg/dL 2.09*   GLUCOSE mg/dL 94   CALCIUM mg/dL 9.2     Results from last 7 days   Lab Units 05/31/23  1811   CK TOTAL U/L 207*   HSTROP T ng/L 17*     Results from last 7 days   Lab Units 06/01/23  0617   WBC 10*3/mm3 13.26*   HEMOGLOBIN g/dL 15.1   HEMATOCRIT % 44.1   PLATELETS 10*3/mm3 322                       EKG (reviewed by me personally):              Assessment:   1.  Near syncope secondary to volume depletion and hypotension  2.  Acute kidney injury  3.  Hypertension at baseline   4.  Obstructive sleep apnea    Plan:       The near syncope was almost certainly secondary to hypotension secondary to volume depletion.  He has responded excellently to IV fluids.  He has had no arrhythmias or rhythm issues since arrival.  I did review his echocardiogram in detail.  His ejection fraction was normal at 60 to 65%, and his heart looked excellent structurally.  I do not feel this is from a cardiac cause.  He does not require any further cardiac evaluation at this time.  I discussed this in detail with the patient at bedside.    Thank you very much for this consult.    Suresh Chacon MD

## 2023-06-01 NOTE — ED NOTES
.Nursing report ED to floor  Armani Medrano  62 y.o.  male    HPI :   Chief Complaint   Patient presents with    Syncope       Admitting doctor:   Reilly Ramírez MD    Admitting diagnosis:   The primary encounter diagnosis was Acute kidney injury. Diagnoses of Vasovagal near-syncope and Dehydration were also pertinent to this visit.    Code status:   Current Code Status       Date Active Code Status Order ID Comments User Context       5/31/2023 2002 CPR (Attempt to Resuscitate) [id number]  Treva Bojorquez APRN ED        Question Answer    Code Status (Patient has no pulse and is not breathing) CPR (Attempt to Resuscitate)    Medical Interventions (Patient has pulse or is breathing) Full Support                    Allergies:   Patient has no known allergies.    Isolation:   No active isolations    Intake and Output  No intake or output data in the 24 hours ending 05/31/23 2014    Weight:       05/31/23  1641   Weight: 109 kg (240 lb)       Most recent vitals:   Vitals:    05/31/23 1730 05/31/23 1731 05/31/23 1831 05/31/23 1953   BP:  122/64 146/68 113/66   BP Location:  Right arm Right arm Right arm   Patient Position:  Lying Sitting Lying   Pulse: 71  70 68   Resp:  18 18 18   Temp:       SpO2: 99%  94% 94%   Weight:       Height:           Active LDAs/IV Access:   Lines, Drains & Airways       Active LDAs       Name Placement date Placement time Site Days    Peripheral IV 05/31/23 1643 Left Antecubital 05/31/23 1643  Antecubital  less than 1                    Labs (abnormal labs have a star):   Labs Reviewed   COMPREHENSIVE METABOLIC PANEL - Abnormal; Notable for the following components:       Result Value    Glucose 109 (*)     BUN 30 (*)     Creatinine 2.75 (*)     Alkaline Phosphatase 118 (*)     eGFR 25.1 (*)     All other components within normal limits    Narrative:     GFR Normal >60  Chronic Kidney Disease <60  Kidney Failure <15     SINGLE HSTROPONIN T - Abnormal; Notable for the following components:     HS Troponin T 17 (*)     All other components within normal limits    Narrative:     High Sensitive Troponin T Reference Range:  <10.0 ng/L- Negative Female for AMI  <15.0 ng/L- Negative Male for AMI  >=10 - Abnormal Female indicating possible myocardial injury.  >=15 - Abnormal Male indicating possible myocardial injury.   Clinicians would have to utilize clinical acumen, EKG, Troponin, and serial changes to determine if it is an Acute Myocardial Infarction or myocardial injury due to an underlying chronic condition.        CBC WITH AUTO DIFFERENTIAL - Abnormal; Notable for the following components:    WBC 19.81 (*)     MCHC 35.8 (*)     Lymphocyte % 16.5 (*)     Immature Grans % 0.9 (*)     Neutrophils, Absolute 14.62 (*)     Lymphocytes, Absolute 3.26 (*)     Monocytes, Absolute 1.56 (*)     Immature Grans, Absolute 0.18 (*)     All other components within normal limits   CK   CBC AND DIFFERENTIAL    Narrative:     The following orders were created for panel order CBC & Differential.  Procedure                               Abnormality         Status                     ---------                               -----------         ------                     CBC Auto Differential[[id number]]        Abnormal            Final result                 Please view results for these tests on the individual orders.       EKG:   ECG 12 Lead Syncope   Final Result   HEART RATE= 66  bpm   RR Interval= 909  ms   TN Interval= 204  ms   P Horizontal Axis= 6  deg   P Front Axis= 55  deg   QRSD Interval= 100  ms   QT Interval= 440  ms   QRS Axis= -78  deg   T Wave Axis= 53  deg   - ABNORMAL ECG -   Sinus rhythm   Inferior infarct, old   No Prior Tracing for Comparison   Electronically Signed By: Jm Rodriguez ([facility]) 31-May-2023 18:16:55   Date and Time of Study: 2023-05-31 17:43:47          Meds given in ED:   Medications   lactated ringers bolus 1,000 mL (has no administration in time range)   sodium chloride 0.9 % flush 10 mL  (has no administration in time range)   sodium chloride 0.9 % flush 10 mL (has no administration in time range)   sodium chloride 0.9 % infusion 40 mL (has no administration in time range)   ondansetron (ZOFRAN) tablet 4 mg (has no administration in time range)     Or   ondansetron (ZOFRAN) injection 4 mg (has no administration in time range)   nitroglycerin (NITROSTAT) SL tablet 0.4 mg (has no administration in time range)   sodium chloride 0.9 % infusion (has no administration in time range)       Imaging results:  No radiology results for the last day    Ambulatory status:   - standby    Social issues:   Social History     Socioeconomic History    Marital status: Single       NIH Stroke Scale:         Radha Patel RN  05/31/23 20:14 EDT

## 2023-06-01 NOTE — H&P
[facility]   HISTORY AND PHYSICAL    Patient Name: Armani Medrano  : 1960  MRN: [id number]  Primary Care Physician:  Robyn Mendieta, PAULINO  Date of admission: 2023    Subjective   Subjective     Chief Complaint: Acute kidney injury    History of Present Illness  Armani Medrano is a 62-year-old male, with a past medical history of hypertension, KAL, presented to the emergency department after having an episode of low blood pressure at work.  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  He felt like he was going to pass out so he sat down and after 5 to 10 minutes his symptoms improved.  He stood up again and walked and felt like he was going to pass out again so he sat back down.  This happened 4-5 times and he finally called his boss and asked him to call EMS.  Upon arrival of EMS his blood pressure was 50/30.  He was given IV fluids and his blood pressure returned to normal.  He states that he does not drink any water and a normal day.  In the morning he drinks a 5-hour energy drink, then drinks regular Mountain Dew or Coke throughout the day.  Denies any chest pain, headache, nausea, vomiting at this time.      Review of Systems   Constitutional: Positive for diaphoresis and fatigue.   Eyes: Negative.    Respiratory: Positive for shortness of breath.    Cardiovascular: Negative.    Gastrointestinal: Positive for nausea. Negative for vomiting.   Endocrine: Negative.    Genitourinary: Negative.    Musculoskeletal: Negative.    Skin: Negative.    Allergic/Immunologic: Negative.    Neurological: Positive for dizziness and light-headedness. Negative for syncope, speech difficulty and numbness.   Hematological: Negative.    Psychiatric/Behavioral: Negative.         Personal History     History reviewed. No pertinent past medical history.    History reviewed. No pertinent surgical history.    Family History: family history is not on  file. Otherwise pertinent FHx was reviewed and not pertinent to current issue.    Social History:      Home Medications:       Allergies:  No Known Allergies    Objective    Objective     Vitals:   Temp:  [98 °F (36.7 °C)] 98 °F (36.7 °C)  Heart Rate:  [67-79] 68  Resp:  [17-18] 18  BP: ()/(49-91) 113/66  Flow (L/min):  [2] 2    Physical Exam  Vitals and nursing note reviewed.   Constitutional:       General: He is not in acute distress.     Appearance: Normal appearance.   Cardiovascular:      Rate and Rhythm: Normal rate and regular rhythm.      Pulses: Normal pulses.      Heart sounds: Normal heart sounds.   Pulmonary:      Effort: Pulmonary effort is normal.      Breath sounds: Normal breath sounds.   Abdominal:      General: Bowel sounds are normal.      Palpations: Abdomen is soft.   Musculoskeletal:         General: Normal range of motion.   Skin:     General: Skin is warm and dry.      Capillary Refill: Capillary refill takes less than 2 seconds.   Neurological:      General: No focal deficit present.      Mental Status: He is alert and oriented to person, place, and time.   Psychiatric:         Mood and Affect: Mood normal.         Behavior: Behavior normal.         Thought Content: Thought content normal.         Judgment: Judgment normal.         Result Review    Result Review:  I have personally reviewed the results from the time of this admission to 5/31/2023 20:03 EDT and agree with these findings:  [x]  Laboratory list / accordion  []  Microbiology  []  Radiology  [x]  EKG/Telemetry   []  Cardiology/Vascular   []  Pathology  [x]  Old records  []  Other:      Assessment & Plan   Assessment / Plan     Brief Patient Summary:  Armani Medrano is a 62 y.o. male who was admitted to the observation unit for further evaluation and treatment of this acute kidney injury.    Active Hospital Problems:  Active Hospital Problems    Diagnosis    • **Acute kidney injury      Plan:   Acute kidney injury  - Cardiac  monitoring  - Continuous pulse ox  - Vital signs every 4 hours  - Normal saline at 150 mL/h  - Repeat labs in a.m.  - Avoid nephrotoxic drugs      DVT prophylaxis:  Mechanical DVT prophylaxis orders are present.    CODE STATUS:    Code Status (Patient has no pulse and is not breathing): CPR (Attempt to Resuscitate)  Medical Interventions (Patient has pulse or is breathing): Full Support    Admission Status:  I believe this patient meets observation status.    78 minutes has been spent by [facility] providers in the care of this patient while under observation status.      Treva Bojorquez, APRN

## 2023-06-02 VITALS
WEIGHT: 236 LBS | DIASTOLIC BLOOD PRESSURE: 75 MMHG | BODY MASS INDEX: 34.96 KG/M2 | HEART RATE: 66 BPM | HEIGHT: 69 IN | RESPIRATION RATE: 16 BRPM | OXYGEN SATURATION: 95 % | SYSTOLIC BLOOD PRESSURE: 167 MMHG | TEMPERATURE: 98.1 F

## 2023-06-02 PROBLEM — N17.9 ACUTE KIDNEY INJURY: Status: RESOLVED | Noted: 2023-05-31 | Resolved: 2023-06-02

## 2023-06-02 LAB
ANION GAP SERPL CALCULATED.3IONS-SCNC: 9.4 MMOL/L (ref 5–15)
BASOPHILS # BLD AUTO: 0.04 10*3/MM3 (ref 0–0.2)
BASOPHILS NFR BLD AUTO: 0.3 % (ref 0–1.5)
BUN SERPL-MCNC: 26 MG/DL (ref 8–23)
BUN/CREAT SERPL: 21.8 (ref 7–25)
CALCIUM SPEC-SCNC: 9.6 MG/DL (ref 8.6–10.5)
CHLORIDE SERPL-SCNC: 109 MMOL/L (ref 98–107)
CO2 SERPL-SCNC: 22.6 MMOL/L (ref 22–29)
CREAT SERPL-MCNC: 1.19 MG/DL (ref 0.76–1.27)
DEPRECATED RDW RBC AUTO: 38.7 FL (ref 37–54)
EGFRCR SERPLBLD CKD-EPI 2021: 68.6 ML/MIN/1.73
EOSINOPHIL # BLD AUTO: 0.26 10*3/MM3 (ref 0–0.4)
EOSINOPHIL NFR BLD AUTO: 2.2 % (ref 0.3–6.2)
ERYTHROCYTE [DISTWIDTH] IN BLOOD BY AUTOMATED COUNT: 12.5 % (ref 12.3–15.4)
GLUCOSE SERPL-MCNC: 83 MG/DL (ref 65–99)
HCT VFR BLD AUTO: 42.4 % (ref 37.5–51)
HGB BLD-MCNC: 14.7 G/DL (ref 13–17.7)
IMM GRANULOCYTES # BLD AUTO: 0.04 10*3/MM3 (ref 0–0.05)
IMM GRANULOCYTES NFR BLD AUTO: 0.3 % (ref 0–0.5)
LYMPHOCYTES # BLD AUTO: 3.84 10*3/MM3 (ref 0.7–3.1)
LYMPHOCYTES NFR BLD AUTO: 32.4 % (ref 19.6–45.3)
MCH RBC QN AUTO: 29.2 PG (ref 26.6–33)
MCHC RBC AUTO-ENTMCNC: 34.7 G/DL (ref 31.5–35.7)
MCV RBC AUTO: 84.3 FL (ref 79–97)
MONOCYTES # BLD AUTO: 1.05 10*3/MM3 (ref 0.1–0.9)
MONOCYTES NFR BLD AUTO: 8.9 % (ref 5–12)
NEUTROPHILS NFR BLD AUTO: 55.9 % (ref 42.7–76)
NEUTROPHILS NFR BLD AUTO: 6.63 10*3/MM3 (ref 1.7–7)
NRBC BLD AUTO-RTO: 0 /100 WBC (ref 0–0.2)
PLATELET # BLD AUTO: 298 10*3/MM3 (ref 140–450)
PMV BLD AUTO: 10.5 FL (ref 6–12)
POTASSIUM SERPL-SCNC: 4.8 MMOL/L (ref 3.5–5.2)
RBC # BLD AUTO: 5.03 10*6/MM3 (ref 4.14–5.8)
SODIUM SERPL-SCNC: 141 MMOL/L (ref 136–145)
WBC NRBC COR # BLD: 11.86 10*3/MM3 (ref 3.4–10.8)

## 2023-06-02 PROCEDURE — 96361 HYDRATE IV INFUSION ADD-ON: CPT

## 2023-06-02 PROCEDURE — G0378 HOSPITAL OBSERVATION PER HR: HCPCS

## 2023-06-02 PROCEDURE — 85025 COMPLETE CBC W/AUTO DIFF WBC: CPT | Performed by: NURSE PRACTITIONER

## 2023-06-02 PROCEDURE — 80048 BASIC METABOLIC PNL TOTAL CA: CPT | Performed by: NURSE PRACTITIONER

## 2023-06-02 RX ADMIN — SODIUM CHLORIDE 150 ML/HR: 9 INJECTION, SOLUTION INTRAVENOUS at 02:13

## 2023-06-02 RX ADMIN — Medication 10 ML: at 08:08

## 2023-06-02 NOTE — DISCHARGE INSTRUCTIONS
Cautious use of NSAIDs, recommend no more than 2 times per week.  Please reduce your intake of Mountain Dew and increase your intake of water or other noncarbonated beverages such as sports drinks like Gatorade or Powerade.  Return to the emergency department if you develop fever greater than 101, chest pain, palpitation or shortness of breath.

## 2023-06-02 NOTE — PROGRESS NOTES
.  ED OBSERVATION PROGRESS/DISCHARGE SUMMARY    Date of Admission: 5/31/2023   LOS: 0 days   PCP: Robyn Mendieta APRN      Subjective   Resting comfortably and in no acute distress, eager to go home.      Hospital outcome    Pleasant afebrile ambulatory 62  male admitted to the ED Observation unit for near syncope. He was found to have an acute kidney injury.He reports he only drinks mountain dew usually.  Patient was seen and evaluated by Dr. Franks with the nephrology service who ordered urine studies and renal ultrasound.     His orthostatic vital signs were negative . After a liter bolus and increased PO intake his creatinine improved from 2.75 to 2.09 .  He underwent echocardiogram which showed ejection fraction 60 to 65% without evidence pericardial effusion.  Seen and evaluated by Dr. Chacon with cardiology service who does not feel there is an acute cardiac event that his cause patient's episode.      Urine studies and renal ultrasound negative.  Repeat BMP this morning shows a creatinine down to 1.19 from 2.09.  Patient is feeling well this morning and year ago home.  He has had no further episodes of near syncope.  Discussed focusing on water intake and decreasing intake of Mountain Dew.  Patient is agreeable, he will follow-up with his primary care provider.    ROS:  General: no fevers, chills  Respiratory: no cough, dyspnea  Cardiovascular: no chest pain, palpitations  Abdomen: No abdominal pain, nausea, vomiting, or diarrhea  Neurologic: No focal weakness    Objective   Physical Exam:  I have reviewed the vital signs.  Temp:  [97.4 °F (36.3 °C)-98.4 °F (36.9 °C)] 98.4 °F (36.9 °C)  Heart Rate:  [59-81] 63  Resp:  [16-18] 16  BP: (118-157)/(58-71) 145/62  General Appearance:    Alert, cooperative, no distress  Head:    Normocephalic, atraumatic  Eyes:    Sclerae anicteric  Neck:   Supple, no mass  Lungs: Clear to auscultation bilaterally, respirations unlabored  Heart: Regular  rate and rhythm, S1 and S2 normal, no murmur, rub or gallop  Abdomen:  Soft, non-tender, bowel sounds active, nondistended  Extremities: No clubbing, cyanosis, or edema to lower extremities  Pulses:  2+ and symmetric in distal lower extremities  Skin: No rashes   Neurologic: Oriented x3, Normal strength to extremities    Results Review:    I have reviewed the labs, radiology results and diagnostic studies.    Results from last 7 days   Lab Units 06/01/23  0617   WBC 10*3/mm3 13.26*   HEMOGLOBIN g/dL 15.1   HEMATOCRIT % 44.1   PLATELETS 10*3/mm3 322     Results from last 7 days   Lab Units 06/01/23  0617 05/31/23  1811   SODIUM mmol/L 138 137   POTASSIUM mmol/L 3.9 4.4   CHLORIDE mmol/L 102 101   CO2 mmol/L 22.5 22.0   BUN mg/dL 35* 30*   CREATININE mg/dL 2.09* 2.75*   CALCIUM mg/dL 9.2 9.4   BILIRUBIN mg/dL  --  0.5   ALK PHOS U/L  --  118*   ALT (SGPT) U/L  --  21   AST (SGOT) U/L  --  21   GLUCOSE mg/dL 94 109*     Imaging Results (Last 24 Hours)     Procedure Component Value Units Date/Time    US Renal Bilateral [[id number]] Collected: 06/01/23 1532     Updated: 06/01/23 1535    Narrative:      US RENAL BILATERAL-  06/01/2023     HISTORY: Renal insufficiency.     The right kidney measures 10.4 cm in length. Left kidney measures 11.4  cm in length. No hydronephrosis, stones or renal masses are seen.  Urinary bladder is unremarkable.       Impression:      1. Normal bilateral renal ultrasound.     This report was finalized on 6/1/2023 3:32 PM by Dr. Blair Snyder M.D.             I have reviewed the medications.  ---------------------------------------------------------------------------------------------  Assessment & Plan   Assessment/Problem List    Acute kidney injury      Plan:  Acute kidney injury  -Resolved  -Creatinine on admission 2.75 on 5/31, down to 1.19 this morning after IV fluids  -Nephrology consult, further work up including renal ultrasound and urine studies are unremarkable   -Cardiology  Consult, cleared for discharge   -orthostatic vitals signs negative    Hyperlipidemia  -Continue atorvastatin    Hypertension  -Continue amlodipine and lisinopril      Disposition: Home    Follow-up after Discharge: PCP    This note will serve as a discharge summary    PAULINO De La Cruz 06/02/23 01:05 EDT    I have worn appropriate PPE during this patient encounter, sanitized my hands both with entering and exiting patient's room.      33 minutes has been spent by [facility] providers in the care of this patient while under observation status

## 2023-06-02 NOTE — PLAN OF CARE
Goal Outcome Evaluation:   Patient admitted for acute kidney injury.  Outcome summary: Patient has been stable all night. Up ad lois, AO x4. Fluids have continued throughout the night. Awaiting results from morning labs for next course of action. Nephrology to follow up.

## 2023-06-02 NOTE — PLAN OF CARE
Goal Outcome Evaluation:              Outcome Evaluation: patient discharging home with pcp follow up and instructions to stay hydrated. patient received dc instructions and verbalized understanding, denied having any questions. Patient ambulated out of the observation unit with a steady gait.